# Patient Record
Sex: FEMALE | Race: WHITE | Employment: PART TIME | URBAN - METROPOLITAN AREA
[De-identification: names, ages, dates, MRNs, and addresses within clinical notes are randomized per-mention and may not be internally consistent; named-entity substitution may affect disease eponyms.]

---

## 2020-01-20 PROBLEM — G35 MS (MULTIPLE SCLEROSIS) (HCC): Status: ACTIVE | Noted: 2019-05-30

## 2020-01-20 PROBLEM — Z80.8 FAMILY HISTORY OF MELANOMA: Status: ACTIVE | Noted: 2020-01-20

## 2020-01-20 PROBLEM — Z85.848: Status: ACTIVE | Noted: 2020-01-20

## 2020-01-20 PROBLEM — G25.81 RESTLESS LEG SYNDROME, CONTROLLED: Status: ACTIVE | Noted: 2019-05-30

## 2021-04-05 ENCOUNTER — HOSPITAL ENCOUNTER (OUTPATIENT)
Dept: MAMMOGRAPHY | Age: 56
Discharge: HOME | End: 2021-04-05
Attending: INTERNAL MEDICINE
Payer: COMMERCIAL

## 2021-04-05 DIAGNOSIS — Z12.31 VISIT FOR SCREENING MAMMOGRAM: ICD-10-CM

## 2021-04-05 PROCEDURE — 77067 SCR MAMMO BI INCL CAD: CPT

## 2022-03-19 PROBLEM — G25.81 RESTLESS LEG SYNDROME, CONTROLLED: Status: ACTIVE | Noted: 2019-05-30

## 2022-03-19 PROBLEM — Z80.8 FAMILY HISTORY OF MELANOMA: Status: ACTIVE | Noted: 2020-01-20

## 2022-03-20 PROBLEM — Z85.848: Status: ACTIVE | Noted: 2020-01-20

## 2022-03-20 PROBLEM — G35 MS (MULTIPLE SCLEROSIS) (HCC): Status: ACTIVE | Noted: 2019-05-30

## 2022-05-23 ENCOUNTER — TELEPHONE (OUTPATIENT)
Dept: INTERNAL MEDICINE CLINIC | Facility: CLINIC | Age: 57
End: 2022-05-23

## 2022-05-23 NOTE — TELEPHONE ENCOUNTER
Lesion removed was a benign melanocytic nevus. No cause for concern. Hope you are feeling well. Thanks.   Trevor Rider

## 2022-06-14 ENCOUNTER — TELEPHONE (OUTPATIENT)
Dept: SURGERY | Age: 57
End: 2022-06-14

## 2022-06-14 NOTE — TELEPHONE ENCOUNTER
Patient called and cancelled her colonoscopy on 11/2/22 with Dr. Jeniffer May. She did not reschedule.  Jo Beasley

## 2022-07-18 ENCOUNTER — HOSPITAL ENCOUNTER (OUTPATIENT)
Dept: LAB | Age: 57
Discharge: HOME | End: 2022-07-21

## 2022-07-18 PROCEDURE — 88312 SPECIAL STAINS GROUP 1: CPT

## 2022-07-18 PROCEDURE — 88305 TISSUE EXAM BY PATHOLOGIST: CPT

## 2022-10-10 ENCOUNTER — PREP FOR PROCEDURE (OUTPATIENT)
Dept: SURGERY | Age: 57
End: 2022-10-10

## 2022-10-11 RX ORDER — SODIUM CHLORIDE 0.9 % (FLUSH) 0.9 %
5-40 SYRINGE (ML) INJECTION PRN
OUTPATIENT
Start: 2022-10-11

## 2022-10-11 RX ORDER — SODIUM CHLORIDE 9 MG/ML
INJECTION, SOLUTION INTRAVENOUS PRN
OUTPATIENT
Start: 2022-10-11

## 2022-10-11 RX ORDER — SODIUM CHLORIDE 0.9 % (FLUSH) 0.9 %
5-40 SYRINGE (ML) INJECTION EVERY 12 HOURS SCHEDULED
OUTPATIENT
Start: 2022-10-11

## 2022-12-12 ENCOUNTER — OFFICE VISIT (OUTPATIENT)
Dept: INTERNAL MEDICINE CLINIC | Facility: CLINIC | Age: 57
End: 2022-12-12
Payer: COMMERCIAL

## 2022-12-12 ENCOUNTER — TELEPHONE (OUTPATIENT)
Dept: INTERNAL MEDICINE CLINIC | Facility: CLINIC | Age: 57
End: 2022-12-12

## 2022-12-12 VITALS
HEART RATE: 73 BPM | TEMPERATURE: 97.9 F | OXYGEN SATURATION: 100 % | WEIGHT: 182 LBS | RESPIRATION RATE: 17 BRPM | HEIGHT: 70 IN | SYSTOLIC BLOOD PRESSURE: 129 MMHG | DIASTOLIC BLOOD PRESSURE: 79 MMHG | BODY MASS INDEX: 26.05 KG/M2

## 2022-12-12 DIAGNOSIS — M47.816 LUMBAR SPONDYLOSIS: Primary | ICD-10-CM

## 2022-12-12 DIAGNOSIS — M53.3 SACRAL BACK PAIN: ICD-10-CM

## 2022-12-12 DIAGNOSIS — S09.92XA BLUNT TRAUMA OF NOSE, INITIAL ENCOUNTER: Primary | ICD-10-CM

## 2022-12-12 PROCEDURE — 3074F SYST BP LT 130 MM HG: CPT | Performed by: NURSE PRACTITIONER

## 2022-12-12 PROCEDURE — 3078F DIAST BP <80 MM HG: CPT | Performed by: NURSE PRACTITIONER

## 2022-12-12 PROCEDURE — 99214 OFFICE O/P EST MOD 30 MIN: CPT | Performed by: NURSE PRACTITIONER

## 2022-12-12 SDOH — ECONOMIC STABILITY: FOOD INSECURITY: WITHIN THE PAST 12 MONTHS, YOU WORRIED THAT YOUR FOOD WOULD RUN OUT BEFORE YOU GOT MONEY TO BUY MORE.: NEVER TRUE

## 2022-12-12 SDOH — ECONOMIC STABILITY: FOOD INSECURITY: WITHIN THE PAST 12 MONTHS, THE FOOD YOU BOUGHT JUST DIDN'T LAST AND YOU DIDN'T HAVE MONEY TO GET MORE.: NEVER TRUE

## 2022-12-12 ASSESSMENT — ENCOUNTER SYMPTOMS
VOMITING: 0
COLOR CHANGE: 0
NAUSEA: 0
BACK PAIN: 1
FACIAL SWELLING: 0
RHINORRHEA: 0

## 2022-12-12 ASSESSMENT — SOCIAL DETERMINANTS OF HEALTH (SDOH): HOW HARD IS IT FOR YOU TO PAY FOR THE VERY BASICS LIKE FOOD, HOUSING, MEDICAL CARE, AND HEATING?: NOT HARD AT ALL

## 2022-12-12 NOTE — PROGRESS NOTES
12/12/2022 10:01 AM  Location:[facility] [address] [facility]  [place]  Patient #:  [id number]  YOB: 1965          YOUR LAST HEMOGLOBIN A1CS:   No results found for: HBA1C, [id number]    YOUR LAST LIPID PROFILE:   Lab Results   Component Value Date/Time    CHOL 209 03/23/2022 11:29 AM    HDL 75 03/23/2022 11:29 AM    VLDL 15 03/23/2022 11:29 AM         Lab Results   Component Value Date/Time    GFRAA 102 03/15/2021 08:20 AM    BUN 12 03/23/2022 11:29 AM     03/23/2022 11:29 AM    K 4.4 03/23/2022 11:29 AM     03/23/2022 11:29 AM    CO2 24 03/23/2022 11:29 AM           History of Present Illness     Chief Complaint   Patient presents with    Tailbone Pain     Pain when standing and sitting. Sacrum fracture in the past.        Ms. Mango Fajardo is a 59 y.o. female  who presents for nontraumatic sacral pain, nasal trauma. Ms. Mango Fajardo presents with 3 weeks of nontraumatic sacral pain. 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 She had the tumor removed in did not have to go through chemotherapy or radiation. She also has RSD, MS and hypertension. Recently she has had some elevated liver enzymes and is being followed by gastroenterology. Notes that for the past 3 weeks she has a generalized discomfort to her tailbone. She reports no new radicular pain, weakness, numbness, saddle anesthesia, fevers, weight loss. Reports that she has sacral bone pain as well as lumbar pain when she presses on the area or when she goes from sitting to standing. She has no history of osteoporosis and has never had any surgeries to that area except for the tumor removal.  She also notes that last night her dog accidentally hit her in the right side of the nose with his head. She heard a crack and ever since then her nose is mildly displaced to the left.   She initially had some bleeding from her right nare which has since stopped. She reports it feels full and experiences some pressure in that area. She denies any other facial pain, headaches, loss of consciousness. Allergies   Allergen Reactions    Penicillins Rash     Past Medical History:   Diagnosis Date    Cancer of spinal cord ([address] 2005    Hypertension     RSD (reflex sympathetic dystrophy)      Social History     Socioeconomic History    Marital status:      Spouse name: None    Number of children: None    Years of education: None    Highest education level: None   Tobacco Use    Smoking status: Never    Smokeless tobacco: Never   Substance and Sexual Activity    Alcohol use: Yes    Drug use: Never     Social Determinants of Health     Financial Resource Strain: Low Risk     Difficulty of Paying Living Expenses: Not hard at all   Food Insecurity: No Food Insecurity    Worried About [facility] in the Last Year: Never true    Ran Out of Food in the Last Year: Never true     Past Surgical History:   Procedure Laterality Date    BREAST REDUCTION SURGERY       Current Outpatient Medications   Medication Sig Dispense Refill    amitriptyline (ELAVIL) 50 MG tablet TAKE 1 TABLET BY MOUTH EVERY DAY AT NIGHT      cyclobenzaprine (FLEXERIL) 5 MG tablet Take 5 mg by mouth 3 times daily as needed      dimethyl fumarate (TECFIDERA) 240 MG delayed release capsule Take by mouth daily      famotidine (PEPCID) 40 MG tablet Take 40 mg by mouth      gabapentin (NEURONTIN) 300 MG capsule Take 300 mg by mouth.      lisinopril (PRINIVIL;ZESTRIL) 10 MG tablet TAKE ONE TABLET BY MOUTH ONE TIME DAILY      omeprazole (PRILOSEC) 40 MG delayed release capsule Take 40 mg by mouth daily      pramipexole (MIRAPEX) 0.5 MG tablet TAKE 1 TABLET (0.5 MG) BY MOUTH 3 (THREE) TIMES A DAY       No current facility-administered medications for this visit.      Health Maintenance   Topic Date Due    HIV screen  Never done    DTaP/Tdap/Td vaccine (1 - Tdap) Never done    Shingles vaccine (1 of 2) Never done    COVID-19 Vaccine (2 - Booster for Terrell series) 08/13/2021    Flu vaccine (1) Never done    Depression Screen  03/23/2023    Breast cancer screen  05/24/2024    Cervical cancer screen  03/22/2026    Lipids  03/23/2027    Colorectal Cancer Screen  07/18/2032    Hepatitis C screen  Completed    Hepatitis A vaccine  Aged Out    Hib vaccine  Aged Out    Meningococcal (ACWY) vaccine  Aged Out    Pneumococcal 0-64 years Vaccine  Aged Out     Family History   Problem Relation Age of Onset    Hypertension Father     Alzheimer's Disease Father     Hypertension Mother              Review of Systems  Review of Systems   Constitutional:  Negative for appetite change, chills and fever. HENT:  Positive for nosebleeds. Negative for facial swelling and rhinorrhea. Nasal deformity since dog hit her in the face last night   Cardiovascular:  Negative for leg swelling. Gastrointestinal:  Negative for nausea and vomiting. Musculoskeletal:  Positive for arthralgias (has numbness down legs, no new sx.) and back pain. Negative for gait problem and joint swelling. Skin:  Negative for color change. All other systems reviewed and are negative. /79 (Site: Left Upper Arm, Position: Sitting, Cuff Size: Large Adult)   Pulse 73   Temp 97.9 °F (36.6 °C) (Skin)   Resp 17   Ht 5' 10\" (1.778 m)   Wt 182 lb (82.6 kg)   SpO2 100%   BMI 26.11 kg/m²       Physical Exam    Physical Exam  Vitals and nursing note reviewed. Constitutional:       General: She is not in acute distress. Appearance: She is not ill-appearing, toxic-appearing or diaphoretic. HENT:      Head: Normocephalic. No raccoon eyes, Dalton's sign, contusion, right periorbital erythema or left periorbital erythema. Jaw: There is normal jaw occlusion. Nose: Nasal deformity (right nare tender, nose deviated left) present. Right Nostril: No foreign body, epistaxis, septal hematoma or occlusion.       Left Nostril: No foreign body, epistaxis, septal hematoma or occlusion. Right Turbinates: Swollen. Not enlarged. Right Sinus: No maxillary sinus tenderness or frontal sinus tenderness. Left Sinus: No maxillary sinus tenderness or frontal sinus tenderness. Mouth/Throat:      Mouth: Mucous membranes are moist.   Cardiovascular:      Rate and Rhythm: Regular rhythm. Pulmonary:      Effort: No respiratory distress. Breath sounds: No wheezing, rhonchi or rales. Musculoskeletal:      Cervical back: Normal.      Thoracic back: Normal.      Lumbar back: Tenderness present. Back:       Right lower leg: No edema. Left lower leg: No edema. Comments: TTP here, no crepitus or step offs noted. No deformity. Intact distal circulation. BLE strength 5/5   Neurological:      Mental Status: She is oriented to person, place, and time. Assessment & Plan      Current Outpatient Medications   Medication Sig Dispense Refill    amitriptyline (ELAVIL) 50 MG tablet TAKE 1 TABLET BY MOUTH EVERY DAY AT NIGHT      cyclobenzaprine (FLEXERIL) 5 MG tablet Take 5 mg by mouth 3 times daily as needed      dimethyl fumarate (TECFIDERA) 240 MG delayed release capsule Take by mouth daily      famotidine (PEPCID) 40 MG tablet Take 40 mg by mouth      gabapentin (NEURONTIN) 300 MG capsule Take 300 mg by mouth.      lisinopril (PRINIVIL;ZESTRIL) 10 MG tablet TAKE ONE TABLET BY MOUTH ONE TIME DAILY      omeprazole (PRILOSEC) 40 MG delayed release capsule Take 40 mg by mouth daily      pramipexole (MIRAPEX) 0.5 MG tablet TAKE 1 TABLET (0.5 MG) BY MOUTH 3 (THREE) TIMES A DAY       No current facility-administered medications for this visit. 1. Blunt trauma of nose, initial encounter  Urgent referral placed to ENT with turbinate swelling, mildly displacement of nose to left on exam.   She will go obtain x-rays today, we will follow-up to ensure that she has appropriate follow-up with ENT.   She denies any other facial pain, had no LOC. She was hit directly in the nose. Knows to call for any new or worsening symptoms and to go to the emergency department for any pain out of proportion, uncontrolled bleeding, new or concerning symptoms. - XR NASAL BONE (MIN 3 VIEWS ); Future  - [facility], [place]    2. Sacral back pain  Nontraumatic sacral buttock pain with history of spinal cord cancer and traumatic sacral fracture years ago. We will start with x-rays, anticipate that she may need advanced imaging. We will maintain close follow-up. Knows to call for any radicular symptoms or acutely new or worsening symptoms. She has no constitutional symptoms today. - XR LUMBAR SPINE (MIN 4 VIEWS); Future  - XR SACRUM COCCYX (MIN 2 VIEWS); Future      Time spent in counseling and coordination of care today, including review of all records, was 35 minutes, more than half of which was face to face. Discussed HPI with Dr. Armando Gaitan who agrees with plan of care.     Jose Dinh, AMBER, APRN - CNP

## 2022-12-12 NOTE — TELEPHONE ENCOUNTER
Talked with patient about xrays. Reviewed with Dr. Beckie Saucedo. Will proceed with MRI lumbar spine, dx, low back pain, lumbar spondylosis scoliosis and facet ostearthritis, L5-s1, ordered today. Please make sure she has heard from the ENT, urgent referral placed for nasal deformity after trauma. Xray negative for acute fracture. Thanks!

## 2022-12-13 DIAGNOSIS — S09.92XA BLUNT TRAUMA OF NOSE, INITIAL ENCOUNTER: ICD-10-CM

## 2022-12-13 DIAGNOSIS — M53.3 SACRAL BACK PAIN: ICD-10-CM

## 2022-12-13 NOTE — TELEPHONE ENCOUNTER
Patient Notified  Will call insurance to see where cheaper th have MRI done.   Will call back, if order needs to be faxed to another facility

## 2022-12-15 ENCOUNTER — OFFICE VISIT (OUTPATIENT)
Dept: ENT CLINIC | Age: 57
End: 2022-12-15
Payer: COMMERCIAL

## 2022-12-15 VITALS — HEART RATE: 82 BPM | HEIGHT: 70 IN | WEIGHT: 187 LBS | OXYGEN SATURATION: 99 % | BODY MASS INDEX: 26.77 KG/M2

## 2022-12-15 DIAGNOSIS — S02.2XXA CLOSED DISPLACED FRACTURE OF NASAL BONE, INITIAL ENCOUNTER: Primary | ICD-10-CM

## 2022-12-15 DIAGNOSIS — J34.2 DEVIATED NASAL SEPTUM: ICD-10-CM

## 2022-12-15 PROCEDURE — 21315 CLSD TX NSL FX MNPJ WO STBLJ: CPT | Performed by: STUDENT IN AN ORGANIZED HEALTH CARE EDUCATION/TRAINING PROGRAM

## 2022-12-15 PROCEDURE — 99204 OFFICE O/P NEW MOD 45 MIN: CPT | Performed by: STUDENT IN AN ORGANIZED HEALTH CARE EDUCATION/TRAINING PROGRAM

## 2022-12-15 ASSESSMENT — ENCOUNTER SYMPTOMS
EYE DISCHARGE: 0
COLOR CHANGE: 0
ABDOMINAL DISTENTION: 0
APNEA: 0

## 2022-12-15 NOTE — PROGRESS NOTES
HPI:  Lary Infante is a 59 y.o. female seen New    Chief Complaint   Patient presents with    Facial Injury     Patient presets today with facial injury caused by her dog on Sunday . 47-year-old female seen for new patient referral evaluation with stated complaint of recent nasal trauma. She describes this past Sunday approximately 4 days ago having an event where her dog jumped up excitedly and hit into her right lateral nose going from right to left direction. This caused significant pain and brief epistaxis. Her epistaxis resolved but she did note over the next day or so to have swelling to the right nasal bridge and a depression type feeling with slight right-sided nasal airway congestion/obstruction. This prompted PCP evaluation who recommended nasal x-rays which were unremarkable with no evidence of obvious nasal bone fracture. She now presents for follow-up ENT evaluation although the majority of her nasal congestion has resolved. She states that there is an obvious deformity described as the bridge and tip of her nose bending towards the left side. She states the majority of the swelling has resolved. She is no longer noticing nasal congestion. Past Medical History, Past Surgical History, Family history, Social History, and Medications were all reviewed with the patient today and updated as necessary.      Allergies   Allergen Reactions    Penicillins Rash     Other reaction(s): Hives, Rash-Allergy       Patient Active Problem List   Diagnosis    Family history of melanoma    Restless leg syndrome, controlled    Hypertension    MS (multiple sclerosis) (HCC)    RSD (reflex sympathetic dystrophy)    History of cancer of spinal cord       Current Outpatient Medications   Medication Sig    amitriptyline (ELAVIL) 50 MG tablet TAKE 1 TABLET BY MOUTH EVERY DAY AT NIGHT    cyclobenzaprine (FLEXERIL) 5 MG tablet Take 5 mg by mouth 3 times daily as needed    dimethyl fumarate (TECFIDERA) 240 MG delayed release capsule Take by mouth daily    famotidine (PEPCID) 40 MG tablet Take 40 mg by mouth    lisinopril (PRINIVIL;ZESTRIL) 10 MG tablet TAKE ONE TABLET BY MOUTH ONE TIME DAILY    omeprazole (PRILOSEC) 40 MG delayed release capsule Take 40 mg by mouth daily    pramipexole (MIRAPEX) 0.5 MG tablet TAKE 1 TABLET (0.5 MG) BY MOUTH 3 (THREE) TIMES A DAY    gabapentin (NEURONTIN) 300 MG capsule Take 300 mg by mouth. (Patient not taking: Reported on 12/15/2022)     No current facility-administered medications for this visit. Past Medical History:   Diagnosis Date    Cancer of spinal cord ([facility] [address] 2005    Hypertension     RSD (reflex sympathetic dystrophy)        Past Surgical History:   Procedure Laterality Date    BREAST REDUCTION SURGERY         Social History     Tobacco Use    Smoking status: Never    Smokeless tobacco: Never   Substance Use Topics    Alcohol use: Yes       Family History   Problem Relation Age of Onset    Hypertension Father     Alzheimer's Disease Father     Hypertension Mother         ROS:    Review of Systems   Constitutional:  Negative for activity change. HENT:  Negative for congestion. Eyes:  Negative for discharge. Respiratory:  Negative for apnea. Cardiovascular:  Negative for chest pain. Gastrointestinal:  Negative for abdominal distention. Endocrine: Negative for cold intolerance. Genitourinary:  Negative for difficulty urinating. Musculoskeletal:  Negative for arthralgias. Skin:  Negative for color change. Allergic/Immunologic: Negative for environmental allergies. Neurological:  Negative for dizziness. Hematological:  Negative for adenopathy. Psychiatric/Behavioral:  Negative for agitation. PHYSICAL EXAM:    Pulse 82   Ht 5' 10\" (1.778 m)   Wt 187 lb (84.8 kg)   SpO2 99%   BMI 26.83 kg/m²     Physical Exam  Vitals and nursing note reviewed. Constitutional:       Appearance: Normal appearance.    HENT:      Head: Normocephalic and atraumatic. Right Ear: Tympanic membrane, ear canal and external ear normal. There is no impacted cerumen. Left Ear: Tympanic membrane, ear canal and external ear normal. There is no impacted cerumen. Nose: Nasal deformity and septal deviation present. No congestion or rhinorrhea. Comments: Palpation bridge of nose mild bony step-off deformity to the right nasal bone and word towards the midline. Nasal bridge and septum globally deviated to the left with nasal tip to the left. Mouth/Throat:      Mouth: Mucous membranes are moist.      Pharynx: Oropharynx is clear. No oropharyngeal exudate or posterior oropharyngeal erythema. Eyes:      General: No scleral icterus. Pulmonary:      Effort: Pulmonary effort is normal.   Musculoskeletal:      Cervical back: Normal range of motion and neck supple. No rigidity. Lymphadenopathy:      Cervical: No cervical adenopathy. Skin:     General: Skin is warm and dry. Neurological:      Mental Status: She is alert and oriented to person, place, and time. Psychiatric:         Mood and Affect: Mood normal.         Behavior: Behavior normal.        Procedure: Closed reduction nasal bone fracture  Indication: Right-sided nasal bone fracture  Description: Patient with obvious palpable inwardly displaced right nasal bone fracture with step-off deformity. All risk benefits alternatives reviewed for reduction of nasal bone fracture. Topical Afrin and lidocaine were sprayed intranasally for anesthesia and after a few minutes a Claude elevator was placed intranasally deep to the right-sided nasal bone and with minimal pressure the nasal bone was outfractured laterally back to his normal anatomical position with a loud audible pop. There was brief epistaxis that was easily controlled with Afrin soaked cottonball and pressure. Patient tolerated well no complications.       ASSESSMENT and PLAN        ICD-10-CM    1. Closed displaced fracture of nasal bone, initial encounter  S02. 2XXA CT CLOSED TX NASAL BONE FX W/MNPJ W/O STABILIZATION      2. Deviated nasal septum  J34.2           Patient with obvious right-sided nasal bone fracture following injury from her dog to the nose 4 days ago. This was easily reduced as above with minimal epistaxis controlled with pressure and Afrin. Patient should have a notable improvement to nasal airway going forward and for any future residual nasal cosmetic deformity she can follow-up with an outside facial plastic surgeon if needed. Nasal saline misting sprays to the nose twice daily for the next few days and otherwise follow-up with ENT as needed.     Ekta Ferrari, DO  12/15/2022

## 2023-01-31 ENCOUNTER — HOSPITAL ENCOUNTER (OUTPATIENT)
Dept: ENDOSCOPY | Age: 58
Discharge: HOME | End: 2023-02-03
Payer: COMMERCIAL

## 2023-01-31 PROCEDURE — 3609015500 HC GASTRIC/DUODENAL MOTILITY &/OR MANOMETRY STUDY

## 2023-01-31 PROCEDURE — 6370000000 HC RX 637 (ALT 250 FOR IP): Performed by: INTERNAL MEDICINE

## 2023-01-31 RX ORDER — LIDOCAINE HYDROCHLORIDE 20 MG/ML
15 SOLUTION OROPHARYNGEAL
Status: DISCONTINUED | OUTPATIENT
Start: 2023-01-31 | End: 2023-02-04 | Stop reason: HOSPADM

## 2023-01-31 RX ADMIN — LIDOCAINE HYDROCHLORIDE 15 ML: 20 SOLUTION ORAL; TOPICAL at 09:36

## 2023-02-03 NOTE — PROCEDURES
[facility]  PROCEDURE NOTE    Name:  Olya Jo  MR#:  [id number]  :  1965  ACCOUNT #:  [de-identified]  DATE OF SERVICE:    PREOPERATIVE DIAGNOSIS:  Gastroesophageal reflux disease. POSTOPERATIVE DIAGNOSES:  1. Low or hypotensive lower esophageal sphincter pressure - 8 mmHg. 2.  Integrated relaxation pressure 9 mmHg within normal range. 3.  A 3 cm hiatal hernia. 4.  An 80% normal peristalsis and 20% ineffective. 5.  Distal contractile integral average . 6.  Impedance revealed 40% of liquid swallows with complete transit. PROCEDURE PERFORMED:  After obtaining informed consent, the patient underwent nasal intubation with the high-resolution impedance manometry probe. The lower esophageal sphincter was located between 42.3 and 43.9 cm from the naris. There was a 3 cm hiatal hernia present. The lower esophageal sphincter pressure averaged 8 mmHg which is below normal.  The IRP was 9 mmHg. Ten wet swallows were recorded. Two failed and one was borderline weak, but the remainder were normal with an average DCI of . Impedance data revealed 40% of liquid swallows with complete bolus transit. Normal is greater than 80%.     DISPOSITION:  Further followup per Dr. Surinder Garza MD      [id number]  D:  2023 14:27  T:  2023 18:56  JOB #:  [id number]  CC:  Martín Sood MD

## 2023-02-13 DIAGNOSIS — K21.00 GASTRO-ESOPHAGEAL REFLUX DISEASE WITH ESOPHAGITIS, WITHOUT BLEEDING: ICD-10-CM

## 2023-02-13 RX ORDER — OMEPRAZOLE 40 MG/1
CAPSULE, DELAYED RELEASE ORAL
Qty: 30 CAPSULE | Refills: 11 | Status: SHIPPED | OUTPATIENT
Start: 2023-02-13

## 2023-02-14 RX ORDER — PRAMIPEXOLE DIHYDROCHLORIDE 0.5 MG/1
TABLET ORAL
Qty: 90 TABLET | Refills: 11 | Status: SHIPPED | OUTPATIENT
Start: 2023-02-14

## 2023-02-14 RX ORDER — LISINOPRIL 10 MG/1
TABLET ORAL
Qty: 30 TABLET | Refills: 11 | Status: SHIPPED | OUTPATIENT
Start: 2023-02-14

## 2023-03-13 RX ORDER — AMITRIPTYLINE HYDROCHLORIDE 50 MG/1
TABLET, FILM COATED ORAL
Qty: 30 TABLET | Refills: 11 | Status: SHIPPED | OUTPATIENT
Start: 2023-03-13

## 2023-03-27 ENCOUNTER — OFFICE VISIT (OUTPATIENT)
Dept: INTERNAL MEDICINE CLINIC | Facility: CLINIC | Age: 58
End: 2023-03-27
Payer: COMMERCIAL

## 2023-03-27 VITALS
WEIGHT: 180 LBS | DIASTOLIC BLOOD PRESSURE: 80 MMHG | HEIGHT: 70 IN | HEART RATE: 84 BPM | SYSTOLIC BLOOD PRESSURE: 112 MMHG | OXYGEN SATURATION: 99 % | TEMPERATURE: 98.1 F | BODY MASS INDEX: 25.77 KG/M2

## 2023-03-27 DIAGNOSIS — K44.9 HIATAL HERNIA: ICD-10-CM

## 2023-03-27 DIAGNOSIS — Z85.848: ICD-10-CM

## 2023-03-27 DIAGNOSIS — Z00.00 ENCOUNTER FOR GENERAL ADULT MEDICAL EXAMINATION WITHOUT ABNORMAL FINDINGS: ICD-10-CM

## 2023-03-27 DIAGNOSIS — Z80.8 FAMILY HISTORY OF MELANOMA: ICD-10-CM

## 2023-03-27 DIAGNOSIS — G35 MS (MULTIPLE SCLEROSIS) (HCC): Primary | ICD-10-CM

## 2023-03-27 DIAGNOSIS — Z00.00 GENERAL MEDICAL EXAM: ICD-10-CM

## 2023-03-27 DIAGNOSIS — I10 ESSENTIAL (PRIMARY) HYPERTENSION: ICD-10-CM

## 2023-03-27 PROCEDURE — 99396 PREV VISIT EST AGE 40-64: CPT | Performed by: INTERNAL MEDICINE

## 2023-03-27 PROCEDURE — 3074F SYST BP LT 130 MM HG: CPT | Performed by: INTERNAL MEDICINE

## 2023-03-27 PROCEDURE — 99213 OFFICE O/P EST LOW 20 MIN: CPT | Performed by: INTERNAL MEDICINE

## 2023-03-27 PROCEDURE — 3079F DIAST BP 80-89 MM HG: CPT | Performed by: INTERNAL MEDICINE

## 2023-03-27 RX ORDER — LISINOPRIL 10 MG/1
10 TABLET ORAL DAILY
Qty: 30 TABLET | Refills: 11 | Status: CANCELLED | OUTPATIENT
Start: 2023-03-27

## 2023-03-27 RX ORDER — PRAMIPEXOLE DIHYDROCHLORIDE 0.5 MG/1
TABLET ORAL
Qty: 90 TABLET | Refills: 11 | Status: CANCELLED | OUTPATIENT
Start: 2023-03-27

## 2023-03-27 RX ORDER — AMITRIPTYLINE HYDROCHLORIDE 50 MG/1
50 TABLET, FILM COATED ORAL NIGHTLY
Qty: 30 TABLET | Refills: 11 | Status: CANCELLED | OUTPATIENT
Start: 2023-03-27

## 2023-03-27 SDOH — ECONOMIC STABILITY: INCOME INSECURITY: HOW HARD IS IT FOR YOU TO PAY FOR THE VERY BASICS LIKE FOOD, HOUSING, MEDICAL CARE, AND HEATING?: NOT VERY HARD

## 2023-03-27 SDOH — ECONOMIC STABILITY: HOUSING INSECURITY
IN THE LAST 12 MONTHS, WAS THERE A TIME WHEN YOU DID NOT HAVE A STEADY PLACE TO SLEEP OR SLEPT IN A SHELTER (INCLUDING NOW)?: NO

## 2023-03-27 SDOH — ECONOMIC STABILITY: FOOD INSECURITY: WITHIN THE PAST 12 MONTHS, YOU WORRIED THAT YOUR FOOD WOULD RUN OUT BEFORE YOU GOT MONEY TO BUY MORE.: NEVER TRUE

## 2023-03-27 SDOH — ECONOMIC STABILITY: FOOD INSECURITY: WITHIN THE PAST 12 MONTHS, THE FOOD YOU BOUGHT JUST DIDN'T LAST AND YOU DIDN'T HAVE MONEY TO GET MORE.: NEVER TRUE

## 2023-03-27 ASSESSMENT — ENCOUNTER SYMPTOMS
BLOOD IN STOOL: 0
WHEEZING: 0
VOMITING: 0
DIARRHEA: 0
SHORTNESS OF BREATH: 0
COUGH: 0
CONSTIPATION: 0
NAUSEA: 0

## 2023-03-27 ASSESSMENT — PATIENT HEALTH QUESTIONNAIRE - PHQ9
SUM OF ALL RESPONSES TO PHQ9 QUESTIONS 1 & 2: 0
SUM OF ALL RESPONSES TO PHQ QUESTIONS 1-9: 0
2. FEELING DOWN, DEPRESSED OR HOPELESS: 0
SUM OF ALL RESPONSES TO PHQ QUESTIONS 1-9: 0
1. LITTLE INTEREST OR PLEASURE IN DOING THINGS: 0

## 2023-03-27 NOTE — PROGRESS NOTES
3/27/2023 6:10 PM  Location:[facility] [address] [facility]  [place]  Patient #:  [id number]  YOB: 1965            History of Present Illness     Chief Complaint   Patient presents with    Annual Exam     Pt presents to the office today for their annual physical.       Ms. Jayy Duran is a 59 y.o. female  who presents for the above. Notes that she has no new weakness. Is not exercising regularly. Is not particularly cautious about diet. Is here for yearly preventive exam as well as follow up on chronic medical issues.              Allergies   Allergen Reactions    Penicillins Rash     Other reaction(s): Hives, Rash-Allergy     Past Medical History:   Diagnosis Date    Cancer of spinal cord ([address] 2005    Hypertension     RSD (reflex sympathetic dystrophy)      Social History     Socioeconomic History    Marital status:      Spouse name: None    Number of children: None    Years of education: None    Highest education level: None   Tobacco Use    Smoking status: Never    Smokeless tobacco: Never   Substance and Sexual Activity    Alcohol use: Yes    Drug use: Never     Social Determinants of Health     Financial Resource Strain: Low Risk     Difficulty of Paying Living Expenses: Not very hard   Food Insecurity: No Food Insecurity    Worried About Running Out of Food in the Last Year: Never true    Ran Out of Food in the Last Year: Never true   Transportation Needs: Unknown    Lack of Transportation (Non-Medical): No   Housing Stability: Unknown    Unstable Housing in the Last Year: No     Past Surgical History:   Procedure Laterality Date    BREAST REDUCTION SURGERY       Current Outpatient Medications   Medication Sig Dispense Refill    amitriptyline (ELAVIL) 50 MG tablet TAKE 1 TABLET BY MOUTH EVERY DAY AT NIGHT 30 tablet 11    lisinopril (PRINIVIL;ZESTRIL) 10 MG tablet TAKE 1 TABLET BY MOUTH EVERY DAY 30 tablet 11    pramipexole (MIRAPEX) 0.5 MG tablet TAKE 1 TABLET

## 2023-08-22 RX ORDER — AMITRIPTYLINE HYDROCHLORIDE 50 MG/1
50 TABLET, FILM COATED ORAL NIGHTLY
Qty: 30 TABLET | Refills: 11 | Status: SHIPPED | OUTPATIENT
Start: 2023-08-22

## 2023-08-22 RX ORDER — LISINOPRIL 10 MG/1
10 TABLET ORAL DAILY
Qty: 30 TABLET | Refills: 11 | Status: SHIPPED | OUTPATIENT
Start: 2023-08-22

## 2024-03-08 ENCOUNTER — OFFICE VISIT (OUTPATIENT)
Dept: INTERNAL MEDICINE CLINIC | Facility: CLINIC | Age: 59
End: 2024-03-08
Payer: COMMERCIAL

## 2024-03-08 VITALS
RESPIRATION RATE: 18 BRPM | DIASTOLIC BLOOD PRESSURE: 60 MMHG | HEART RATE: 60 BPM | SYSTOLIC BLOOD PRESSURE: 108 MMHG | HEIGHT: 70 IN | WEIGHT: 175.8 LBS | TEMPERATURE: 98.4 F | BODY MASS INDEX: 25.17 KG/M2 | OXYGEN SATURATION: 99 %

## 2024-03-08 DIAGNOSIS — R19.7 DIARRHEA, UNSPECIFIED TYPE: Primary | ICD-10-CM

## 2024-03-08 DIAGNOSIS — Z13.29 SCREENING FOR THYROID DISORDER: ICD-10-CM

## 2024-03-08 DIAGNOSIS — M79.674 GREAT TOE PAIN, RIGHT: ICD-10-CM

## 2024-03-08 DIAGNOSIS — Z13.220 SCREENING FOR LIPID DISORDERS: ICD-10-CM

## 2024-03-08 DIAGNOSIS — R19.7 DIARRHEA, UNSPECIFIED TYPE: ICD-10-CM

## 2024-03-08 LAB
ALBUMIN SERPL-MCNC: 3.8 G/DL (ref 3.2–4.6)
ALBUMIN/GLOB SERPL: 1.2 (ref 0.4–1.6)
ALP SERPL-CCNC: 118 U/L (ref 50–136)
ALT SERPL-CCNC: 53 U/L (ref 12–65)
ANION GAP SERPL CALC-SCNC: 4 MMOL/L (ref 2–11)
AST SERPL-CCNC: 27 U/L (ref 15–37)
BASOPHILS # BLD: 0 K/UL (ref 0–0.2)
BASOPHILS NFR BLD: 1 % (ref 0–2)
BILIRUB SERPL-MCNC: 0.3 MG/DL (ref 0.2–1.1)
BUN SERPL-MCNC: 12 MG/DL (ref 8–23)
CALCIUM SERPL-MCNC: 10.1 MG/DL (ref 8.3–10.4)
CHLORIDE SERPL-SCNC: 109 MMOL/L (ref 103–113)
CHOLEST SERPL-MCNC: 202 MG/DL
CO2 SERPL-SCNC: 29 MMOL/L (ref 21–32)
CREAT SERPL-MCNC: 0.6 MG/DL (ref 0.6–1)
DIFFERENTIAL METHOD BLD: ABNORMAL
EOSINOPHIL # BLD: 0.2 K/UL (ref 0–0.8)
EOSINOPHIL NFR BLD: 6 % (ref 0.5–7.8)
ERYTHROCYTE [DISTWIDTH] IN BLOOD BY AUTOMATED COUNT: 12.7 % (ref 11.9–14.6)
ERYTHROCYTE [SEDIMENTATION RATE] IN BLOOD: 2 MM/HR (ref 0–30)
GLOBULIN SER CALC-MCNC: 3.1 G/DL (ref 2.8–4.5)
GLUCOSE SERPL-MCNC: 90 MG/DL (ref 65–100)
HCT VFR BLD AUTO: 38.1 % (ref 35.8–46.3)
HDLC SERPL-MCNC: 71 MG/DL (ref 40–60)
HDLC SERPL: 2.8
HGB BLD-MCNC: 12.2 G/DL (ref 11.7–15.4)
IMM GRANULOCYTES # BLD AUTO: 0 K/UL (ref 0–0.5)
IMM GRANULOCYTES NFR BLD AUTO: 0 % (ref 0–5)
LDLC SERPL CALC-MCNC: 115.6 MG/DL
LYMPHOCYTES # BLD: 0.9 K/UL (ref 0.5–4.6)
LYMPHOCYTES NFR BLD: 25 % (ref 13–44)
MCH RBC QN AUTO: 31.2 PG (ref 26.1–32.9)
MCHC RBC AUTO-ENTMCNC: 32 G/DL (ref 31.4–35)
MCV RBC AUTO: 97.4 FL (ref 82–102)
MONOCYTES # BLD: 0.3 K/UL (ref 0.1–1.3)
MONOCYTES NFR BLD: 9 % (ref 4–12)
NEUTS SEG # BLD: 2.2 K/UL (ref 1.7–8.2)
NEUTS SEG NFR BLD: 59 % (ref 43–78)
NRBC # BLD: 0 K/UL (ref 0–0.2)
PLATELET # BLD AUTO: 302 K/UL (ref 150–450)
PMV BLD AUTO: 10.2 FL (ref 9.4–12.3)
POTASSIUM SERPL-SCNC: 4.5 MMOL/L (ref 3.5–5.1)
PROT SERPL-MCNC: 6.9 G/DL (ref 6.3–8.2)
RBC # BLD AUTO: 3.91 M/UL (ref 4.05–5.2)
SODIUM SERPL-SCNC: 142 MMOL/L (ref 136–146)
TRIGL SERPL-MCNC: 77 MG/DL (ref 35–150)
TSH W FREE THYROID IF ABNORMAL: 1.68 UIU/ML (ref 0.36–3.74)
URATE SERPL-MCNC: 3.1 MG/DL (ref 2.6–6)
VLDLC SERPL CALC-MCNC: 15.4 MG/DL (ref 6–23)
WBC # BLD AUTO: 3.7 K/UL (ref 4.3–11.1)

## 2024-03-08 PROCEDURE — 3074F SYST BP LT 130 MM HG: CPT | Performed by: NURSE PRACTITIONER

## 2024-03-08 PROCEDURE — 99214 OFFICE O/P EST MOD 30 MIN: CPT | Performed by: NURSE PRACTITIONER

## 2024-03-08 PROCEDURE — 3078F DIAST BP <80 MM HG: CPT | Performed by: NURSE PRACTITIONER

## 2024-03-08 ASSESSMENT — ENCOUNTER SYMPTOMS
DIARRHEA: 1
ABDOMINAL DISTENTION: 1
SHORTNESS OF BREATH: 0
ABDOMINAL PAIN: 0
CONSTIPATION: 0
VOMITING: 0
COUGH: 0
NAUSEA: 0

## 2024-03-08 ASSESSMENT — PATIENT HEALTH QUESTIONNAIRE - PHQ9
SUM OF ALL RESPONSES TO PHQ QUESTIONS 1-9: 0
2. FEELING DOWN, DEPRESSED OR HOPELESS: 0
SUM OF ALL RESPONSES TO PHQ9 QUESTIONS 1 & 2: 0
SUM OF ALL RESPONSES TO PHQ QUESTIONS 1-9: 0
SUM OF ALL RESPONSES TO PHQ QUESTIONS 1-9: 0
1. LITTLE INTEREST OR PLEASURE IN DOING THINGS: 0
SUM OF ALL RESPONSES TO PHQ QUESTIONS 1-9: 0

## 2024-03-08 NOTE — PROGRESS NOTES
Panel; Future    Screening for thyroid disorder  -     TSH with Reflex; Future       Check labs and routine labs as above, has appt with her pcp in 2 weeks. Requested this routine lab work. R/o gout regarding great toe pain.  Check stool studies, r/f c diff with recent hx of multiple rounds abx for uti's. Also check stool for h pylori, advised on avoiding peptobismol/bismuth products and stopping ppi for 1 week prior to turning in stool specimen. Consider referral back to GI based on these findings and her sx.   Will follow up by phone with results and further plans.      Orders Placed This Encounter   Procedures    Culture, Stool     Standing Status:   Future     Standing Expiration Date:   3/8/2025    Ova and Parasite Examination     Standing Status:   Future     Standing Expiration Date:   3/8/2025    C. difficile toxin Molecular     Standing Status:   Future     Standing Expiration Date:   3/8/2025    Gastrointestinal Panel, Molecular     Standing Status:   Future     Standing Expiration Date:   3/8/2025    H. Pylori Antigen, Stool     Standing Status:   Future     Standing Expiration Date:   3/8/2025    CBC with Auto Differential     Standing Status:   Future     Standing Expiration Date:   3/8/2025    Comprehensive Metabolic Panel     Standing Status:   Future     Standing Expiration Date:   3/8/2025    Sedimentation Rate     Standing Status:   Future     Standing Expiration Date:   3/8/2025    Uric Acid     Standing Status:   Future     Standing Expiration Date:   3/8/2025    Lipid Panel     Standing Status:   Future     Standing Expiration Date:   3/8/2025    TSH with Reflex     Standing Status:   Future     Standing Expiration Date:   3/8/2025           No follow-up provider specified.        Lois betancourt, APRN - NP

## 2024-03-15 ENCOUNTER — TELEPHONE (OUTPATIENT)
Dept: INTERNAL MEDICINE CLINIC | Facility: CLINIC | Age: 59
End: 2024-03-15

## 2024-03-15 DIAGNOSIS — R19.7 DIARRHEA, UNSPECIFIED TYPE: ICD-10-CM

## 2024-03-15 DIAGNOSIS — D70.8 OTHER NEUTROPENIA (HCC): Primary | ICD-10-CM

## 2024-03-15 NOTE — TELEPHONE ENCOUNTER
Labs look stable, wbc is mild low and rbc mild low, possibly just a lab variant and sometimes with a viral infection can be lower.   No evidence of gout.   I see no result of stool test yet- did she get that done? If so can we track that down?  How are the symptoms?  We can set up repeat cbc in 2 weeks lab only to recheck wbc. I will order.

## 2024-03-16 DIAGNOSIS — K21.00 GASTRO-ESOPHAGEAL REFLUX DISEASE WITH ESOPHAGITIS, WITHOUT BLEEDING: ICD-10-CM

## 2024-03-18 RX ORDER — OMEPRAZOLE 40 MG/1
CAPSULE, DELAYED RELEASE ORAL
Qty: 90 CAPSULE | Refills: 3 | Status: SHIPPED | OUTPATIENT
Start: 2024-03-18

## 2024-03-19 DIAGNOSIS — R19.7 DIARRHEA, UNSPECIFIED TYPE: ICD-10-CM

## 2024-03-19 RX ORDER — CHOLESTYRAMINE 4 G/9G
1 POWDER, FOR SUSPENSION ORAL DAILY
Qty: 10 PACKET | Refills: 0 | Status: SHIPPED | OUTPATIENT
Start: 2024-03-19 | End: 2024-03-29

## 2024-03-19 NOTE — TELEPHONE ENCOUNTER
I can send in a medication to help slow the diarrhea unfortunately until we know exact results of stool studies an antibiotic is not warranted/it may do more harm than good if it is not any type of bacterial problem. Did she drop off the stool?    Sent in questran powder to try daily for diarrhea , if stools slow down can stop taking.

## 2024-03-19 NOTE — TELEPHONE ENCOUNTER
Patient is dropping stool off today. She states that her symptoms are still bad and she would just like antibiotic. She sees DR Minor on 3/27/2024 she will  have repeat labs drawn at that time

## 2024-03-22 ENCOUNTER — TELEPHONE (OUTPATIENT)
Dept: INTERNAL MEDICINE CLINIC | Facility: CLINIC | Age: 59
End: 2024-03-22

## 2024-03-22 DIAGNOSIS — R19.7 DIARRHEA, UNSPECIFIED TYPE: Primary | ICD-10-CM

## 2024-03-22 LAB
BACTERIA SPEC CULT: NORMAL
H PYLORI AG STL QL IA: NEGATIVE
O+P SPEC MICRO: NORMAL
O+P STL CONC: NORMAL
SERVICE CMNT-IMP: NORMAL
SPECIMEN SOURCE: NORMAL
SPECIMEN SOURCE: NORMAL

## 2024-03-22 NOTE — TELEPHONE ENCOUNTER
I have talked with Ms. Broderick and have given her this message.  She does want a referral to the gastroenterologist

## 2024-03-22 NOTE — TELEPHONE ENCOUNTER
Stool negative for h pylori or other bacterial pathogen. Let  me know if she wants a gi referrL?

## 2024-03-22 NOTE — TELEPHONE ENCOUNTER
[facility], does she want to try anything for any abdominal cramping? I could send in levsin prn in the meantime to help with any diarrhea or cramping.

## 2024-03-26 RX ORDER — HYOSCYAMINE SULFATE 0.12 MG/1
1 TABLET SUBLINGUAL 4 TIMES DAILY PRN
Qty: 120 EACH | Refills: 0 | Status: SHIPPED | OUTPATIENT
Start: 2024-03-26

## 2024-03-26 NOTE — TELEPHONE ENCOUNTER
Pt tolerated infusion well. PICC lines flushed and heparinized. Pt discharged with walker ambulatory accompanied by    sent

## 2024-03-27 ENCOUNTER — OFFICE VISIT (OUTPATIENT)
Dept: INTERNAL MEDICINE CLINIC | Facility: CLINIC | Age: 59
End: 2024-03-27
Payer: COMMERCIAL

## 2024-03-27 VITALS
BODY MASS INDEX: 25.68 KG/M2 | SYSTOLIC BLOOD PRESSURE: 114 MMHG | RESPIRATION RATE: 18 BRPM | DIASTOLIC BLOOD PRESSURE: 67 MMHG | HEART RATE: 62 BPM | WEIGHT: 179 LBS

## 2024-03-27 DIAGNOSIS — R14.0 ABDOMINAL BLOATING: ICD-10-CM

## 2024-03-27 DIAGNOSIS — G90.50 RSD (REFLEX SYMPATHETIC DYSTROPHY): ICD-10-CM

## 2024-03-27 DIAGNOSIS — I10 ESSENTIAL (PRIMARY) HYPERTENSION: ICD-10-CM

## 2024-03-27 DIAGNOSIS — Z00.00 ENCOUNTER FOR WELL ADULT EXAM WITHOUT ABNORMAL FINDINGS: ICD-10-CM

## 2024-03-27 DIAGNOSIS — G35 MS (MULTIPLE SCLEROSIS) (HCC): ICD-10-CM

## 2024-03-27 DIAGNOSIS — R19.7 DIARRHEA, UNSPECIFIED TYPE: Primary | ICD-10-CM

## 2024-03-27 DIAGNOSIS — R19.03 RIGHT LOWER QUADRANT ABDOMINAL MASS: ICD-10-CM

## 2024-03-27 DIAGNOSIS — Z12.31 VISIT FOR SCREENING MAMMOGRAM: ICD-10-CM

## 2024-03-27 DIAGNOSIS — D70.8 OTHER NEUTROPENIA (HCC): ICD-10-CM

## 2024-03-27 DIAGNOSIS — Z80.8 FAMILY HISTORY OF MELANOMA: ICD-10-CM

## 2024-03-27 DIAGNOSIS — Z12.4 SCREENING FOR CERVICAL CANCER: ICD-10-CM

## 2024-03-27 DIAGNOSIS — R31.9 HEMATURIA, UNSPECIFIED TYPE: ICD-10-CM

## 2024-03-27 PROCEDURE — 99396 PREV VISIT EST AGE 40-64: CPT | Performed by: INTERNAL MEDICINE

## 2024-03-27 PROCEDURE — 99213 OFFICE O/P EST LOW 20 MIN: CPT | Performed by: INTERNAL MEDICINE

## 2024-03-27 PROCEDURE — 3074F SYST BP LT 130 MM HG: CPT | Performed by: INTERNAL MEDICINE

## 2024-03-27 PROCEDURE — 3078F DIAST BP <80 MM HG: CPT | Performed by: INTERNAL MEDICINE

## 2024-03-27 RX ORDER — AVOBENZONE, HOMOSALATE, OCTISALATE, OCTOCRYLENE, OXYBENZONE 30; 130; 50; 70; 40 MG/ML; MG/ML; MG/ML; MG/ML; MG/ML
LOTION TOPICAL
Refills: 0 | COMMUNITY
Start: 2024-03-27

## 2024-03-27 RX ORDER — BACILLUS COAGULANS/INULIN 500MM-1.5G
TABLET,CHEWABLE ORAL
COMMUNITY
Start: 2024-03-27

## 2024-03-27 ASSESSMENT — ENCOUNTER SYMPTOMS
ABDOMINAL DISTENTION: 1
BLOOD IN STOOL: 0
WHEEZING: 0
NAUSEA: 1
CONSTIPATION: 0
DIARRHEA: 1
SHORTNESS OF BREATH: 0
COUGH: 0
VOMITING: 0

## 2024-03-27 NOTE — PROGRESS NOTES
are no discontinued medications.     Diagnosis Orders   1. Diarrhea, unspecified type  CT CHEST ABDOMEN PELVIS W CONTRAST Additional Contrast? Radiologist Recommendation      2. Visit for screening mammogram  SE[facility] DIGITAL SCREEN BILATERAL      3. Screening for cervical cancer  PAP IG, Aptima HPV and rfx 16/18,45 (341117)    PAP IG, Aptima HPV and rfx 16/18,45 ([id number])      4. Essential (primary) hypertension        5. MS (multiple sclerosis) (HCC)        6. RSD (reflex sympathetic dystrophy)        7. Family history of melanoma        8. Other neutropenia (HCC)        9. Hematuria, unspecified type  Culture, Urine    Urinalysis      10. Abdominal bloating  CT CHEST ABDOMEN PELVIS W CONTRAST Additional Contrast? Radiologist Recommendation      11. Right lower quadrant abdominal mass  CT CHEST ABDOMEN PELVIS W CONTRAST Additional Contrast? Radiologist Recommendation      12. ACP (advance care planning)        13. Encounter for well adult exam without abnormal findings           Wellness information reviewed and appropriate screening addressed. Vitals look good.  Advised against h. Pylori treatment due to negative antigen screening.  Advised her to keep her appointment with GI scheduled next month.  Add fiber gummy as above.  Will discuss imaging over the phone.  Will discuss labs over the phone.    Labs reviewed.  Primarily reassurance provided today.  Will discuss pathology over the phone.  Knows to keep a low threshold for contacting the office with worsening symptoms. Follow up as documented or earlier as needed.     Return in about 1 year (around 3/27/2025) for YPE, fasting labs prior.          Chiquis Minor, [facility] Care Planning   Discussed the patient’s choices for care and treatment preferences in case of a health event that adversely affects decision-making abilities or is life-limiting. Recommended the patient document care preferences in state-specific advance directives. Also reviewed the process

## 2024-04-02 LAB
COLLECTION METHOD: NORMAL
CYTOLOGIST CVX/VAG CYTO: NORMAL
CYTOLOGY CVX/VAG DOC THIN PREP: NORMAL
DATE OF LMP: 0
HPV APTIMA: NEGATIVE
HPV GENOTYPE REFLEX: NORMAL
Lab: NORMAL
OTHER PT INFO: NORMAL
PAP SOURCE: NORMAL
PATH REPORT.FINAL DX SPEC: NORMAL
PREV CYTO INFO: NORMAL
PREV TREATMENT RESULTS: NORMAL
PREV TREATMENT: NORMAL
STAT OF ADQ CVX/VAG CYTO-IMP: NORMAL

## 2024-04-15 ENCOUNTER — TELEPHONE (OUTPATIENT)
Dept: INTERNAL MEDICINE CLINIC | Facility: CLINIC | Age: 59
End: 2024-04-15

## 2024-04-15 RX ORDER — CHOLESTYRAMINE 4 G/9G
POWDER, FOR SUSPENSION ORAL
Qty: 10 PACKET | Refills: 0 | OUTPATIENT
Start: 2024-04-15

## 2024-04-15 NOTE — TELEPHONE ENCOUNTER
Trinidad from [facility] stated they approval from insurance. They need chart notes for the orders for ct scan on chest, abdomen, and pelvis.   Phone [phone]   Fax [phone]

## 2024-04-17 ENCOUNTER — TELEPHONE (OUTPATIENT)
Dept: INTERNAL MEDICINE CLINIC | Facility: CLINIC | Age: 59
End: 2024-04-17

## 2024-04-17 DIAGNOSIS — R19.03 RIGHT LOWER QUADRANT ABDOMINAL MASS: ICD-10-CM

## 2024-04-17 DIAGNOSIS — R14.0 ABDOMINAL BLOATING: ICD-10-CM

## 2024-04-17 DIAGNOSIS — R19.7 DIARRHEA, UNSPECIFIED TYPE: ICD-10-CM

## 2024-04-17 NOTE — TELEPHONE ENCOUNTER
No acute findings on abdominal imaging.  You do have a lung nodule that we will discuss when you return next year.  Likely no cause for concern. Thanks.  [facility]

## 2024-04-18 ENCOUNTER — TELEPHONE (OUTPATIENT)
Dept: INTERNAL MEDICINE CLINIC | Facility: CLINIC | Age: 59
End: 2024-04-18

## 2024-04-18 NOTE — TELEPHONE ENCOUNTER
You are very low risk.  Follow up imaging is not even indicated however for peace of mind reasons we will plan on repeating in 1 year.  We'll discuss this further at your upcoming office visit. No cause for concern.

## 2024-04-18 NOTE — TELEPHONE ENCOUNTER
MsRadha Meggan has called and I have given her this message. She would like a call back. She is asking about the lung nodule that was found.

## 2024-07-01 RX ORDER — PRAMIPEXOLE DIHYDROCHLORIDE 0.5 MG/1
0.5 TABLET ORAL 3 TIMES DAILY
Qty: 90 TABLET | Refills: 11 | OUTPATIENT
Start: 2024-07-01

## 2024-07-01 RX ORDER — AMITRIPTYLINE HYDROCHLORIDE 50 MG/1
50 TABLET, FILM COATED ORAL NIGHTLY
Qty: 90 TABLET | Refills: 4 | Status: SHIPPED | OUTPATIENT
Start: 2024-07-01

## 2024-07-01 RX ORDER — PRAMIPEXOLE DIHYDROCHLORIDE 0.5 MG/1
0.5 TABLET ORAL 3 TIMES DAILY
Qty: 90 TABLET | Refills: 11 | Status: SHIPPED | OUTPATIENT
Start: 2024-07-01

## 2024-07-01 NOTE — TELEPHONE ENCOUNTER
Sending in half the dosage to start with since you've been off for a while.  Schedule fasting lipid panel and ALT in 2 months please.

## 2024-07-17 DIAGNOSIS — Z12.31 VISIT FOR SCREENING MAMMOGRAM: ICD-10-CM

## 2024-08-23 RX ORDER — LISINOPRIL 10 MG/1
10 TABLET ORAL DAILY
Qty: 90 TABLET | Refills: 3 | Status: SHIPPED | OUTPATIENT
Start: 2024-08-23

## 2025-04-21 RX ORDER — AMITRIPTYLINE HYDROCHLORIDE 50 MG/1
50 TABLET ORAL NIGHTLY
Qty: 90 TABLET | Refills: 3 | Status: SHIPPED | OUTPATIENT
Start: 2025-04-21

## 2025-06-16 ENCOUNTER — PATIENT MESSAGE (OUTPATIENT)
Dept: INTERNAL MEDICINE CLINIC | Facility: CLINIC | Age: 60
End: 2025-06-16

## 2025-06-16 DIAGNOSIS — G35 MS (MULTIPLE SCLEROSIS) (HCC): Primary | ICD-10-CM

## 2025-06-23 ENCOUNTER — CLINICAL SUPPORT (OUTPATIENT)
Age: 60
End: 2025-06-23

## 2025-06-23 VITALS
OXYGEN SATURATION: 97 % | SYSTOLIC BLOOD PRESSURE: 112 MMHG | DIASTOLIC BLOOD PRESSURE: 74 MMHG | TEMPERATURE: 98 F | HEART RATE: 76 BPM | RESPIRATION RATE: 20 BRPM

## 2025-06-23 DIAGNOSIS — G35 MS (MULTIPLE SCLEROSIS) (HCC): Primary | ICD-10-CM

## 2025-06-23 RX ORDER — ACETAMINOPHEN 325 MG/1
650 TABLET ORAL
Status: DISCONTINUED | OUTPATIENT
Start: 2025-06-23 | End: 2025-06-23 | Stop reason: HOSPADM

## 2025-06-23 RX ORDER — ALBUTEROL SULFATE 90 UG/1
4 INHALANT RESPIRATORY (INHALATION) PRN
Status: DISCONTINUED | OUTPATIENT
Start: 2025-06-23 | End: 2025-06-23 | Stop reason: HOSPADM

## 2025-06-23 RX ORDER — SODIUM CHLORIDE 9 MG/ML
5-250 INJECTION, SOLUTION INTRAVENOUS PRN
Status: DISCONTINUED | OUTPATIENT
Start: 2025-06-23 | End: 2025-06-23 | Stop reason: HOSPADM

## 2025-06-23 RX ORDER — EPINEPHRINE 1 MG/ML
0.3 INJECTION, SOLUTION, CONCENTRATE INTRAVENOUS PRN
Status: CANCELLED | OUTPATIENT
Start: 2025-06-24

## 2025-06-23 RX ORDER — ALBUTEROL SULFATE 90 UG/1
4 INHALANT RESPIRATORY (INHALATION) PRN
Status: CANCELLED | OUTPATIENT
Start: 2025-06-24

## 2025-06-23 RX ORDER — ACETAMINOPHEN 325 MG/1
650 TABLET ORAL
Status: CANCELLED | OUTPATIENT
Start: 2025-06-24

## 2025-06-23 RX ORDER — SODIUM CHLORIDE 9 MG/ML
5-250 INJECTION, SOLUTION INTRAVENOUS PRN
Status: CANCELLED | OUTPATIENT
Start: 2025-06-24

## 2025-06-23 RX ORDER — SODIUM CHLORIDE 9 MG/ML
INJECTION, SOLUTION INTRAVENOUS PRN
Status: DISCONTINUED | OUTPATIENT
Start: 2025-06-23 | End: 2025-06-23 | Stop reason: HOSPADM

## 2025-06-23 RX ORDER — HEPARIN 100 UNIT/ML
500 SYRINGE INTRAVENOUS PRN
Status: CANCELLED | OUTPATIENT
Start: 2025-06-24

## 2025-06-23 RX ORDER — ONDANSETRON 2 MG/ML
8 INJECTION INTRAMUSCULAR; INTRAVENOUS
Status: CANCELLED | OUTPATIENT
Start: 2025-06-24

## 2025-06-23 RX ORDER — SODIUM CHLORIDE 9 MG/ML
INJECTION, SOLUTION INTRAVENOUS PRN
Status: CANCELLED | OUTPATIENT
Start: 2025-06-24

## 2025-06-23 RX ORDER — HEPARIN 100 UNIT/ML
500 SYRINGE INTRAVENOUS PRN
Status: DISCONTINUED | OUTPATIENT
Start: 2025-06-23 | End: 2025-06-23 | Stop reason: HOSPADM

## 2025-06-23 RX ORDER — EPINEPHRINE 1 MG/ML
0.3 INJECTION, SOLUTION, CONCENTRATE INTRAVENOUS PRN
Status: DISCONTINUED | OUTPATIENT
Start: 2025-06-23 | End: 2025-06-23 | Stop reason: HOSPADM

## 2025-06-23 RX ORDER — SODIUM CHLORIDE 0.9 % (FLUSH) 0.9 %
5-40 SYRINGE (ML) INJECTION PRN
Status: CANCELLED | OUTPATIENT
Start: 2025-06-24

## 2025-06-23 RX ORDER — ONDANSETRON 2 MG/ML
8 INJECTION INTRAMUSCULAR; INTRAVENOUS
Status: DISCONTINUED | OUTPATIENT
Start: 2025-06-23 | End: 2025-06-23 | Stop reason: HOSPADM

## 2025-06-23 RX ORDER — DIPHENHYDRAMINE HYDROCHLORIDE 50 MG/ML
50 INJECTION, SOLUTION INTRAMUSCULAR; INTRAVENOUS
Status: CANCELLED | OUTPATIENT
Start: 2025-06-24

## 2025-06-23 RX ORDER — HYDROCORTISONE SODIUM SUCCINATE 100 MG/2ML
100 INJECTION INTRAMUSCULAR; INTRAVENOUS
Status: CANCELLED | OUTPATIENT
Start: 2025-06-24

## 2025-06-23 RX ORDER — SODIUM CHLORIDE 0.9 % (FLUSH) 0.9 %
5-40 SYRINGE (ML) INJECTION PRN
Status: DISCONTINUED | OUTPATIENT
Start: 2025-06-23 | End: 2025-06-23 | Stop reason: HOSPADM

## 2025-06-23 RX ORDER — DIPHENHYDRAMINE HYDROCHLORIDE 50 MG/ML
50 INJECTION, SOLUTION INTRAMUSCULAR; INTRAVENOUS
Status: DISCONTINUED | OUTPATIENT
Start: 2025-06-23 | End: 2025-06-23 | Stop reason: HOSPADM

## 2025-06-23 RX ORDER — HYDROCORTISONE SODIUM SUCCINATE 100 MG/2ML
100 INJECTION INTRAMUSCULAR; INTRAVENOUS
Status: DISCONTINUED | OUTPATIENT
Start: 2025-06-23 | End: 2025-06-23 | Stop reason: HOSPADM

## 2025-06-23 ASSESSMENT — PAIN DESCRIPTION - LOCATION: LOCATION: FOOT;HAND

## 2025-06-23 ASSESSMENT — PAIN SCALES - GENERAL: PAINLEVEL_OUTOF10: 3

## 2025-06-23 ASSESSMENT — PAIN DESCRIPTION - ORIENTATION: ORIENTATION: RIGHT;LEFT

## 2025-06-23 ASSESSMENT — PAIN DESCRIPTION - DESCRIPTORS: DESCRIPTORS: BURNING;PINS AND NEEDLES;NUMBNESS

## 2025-06-23 NOTE — PROGRESS NOTES
[facility]  [address], [address]  [place] [address]  Office : [phone], Fax: [phone]     Patient arrived ambulatory to the infusion suite today for a(n) steroid infusion.   Vital signs WNL: /75 (BP Site: Right Upper Arm, Patient Position: Sitting, BP Cuff Size: Medium Adult)   Pulse 76   Temp 98 °F (36.7 °C) (Temporal)   Resp 20   SpO2 98% on RA.   Pain level is 3/10 on the pain scale located in the hands and feet and describing it as numbness, tingling, burning on admission.   No contraindications noted. Patient offered warm blanket and pillow for comfort. Patient up ad zack to BR; offered drink and snacks during visit.    20 g 1\" IV placed in the right AC x 1 attempt; flushed with 10ml NS. Patient tolerated well.   Solu-Medrol 1000mg in 250ml NS administered at  250 ml/hr. Infusion Time: 1 hour 10 minutes.   Patient tolerated the infusion well, no complications noted.   No observation required/recommended.   Post infusion vital signs WNL: /74   Pulse 76   Temp 98 °F (36.7 °C) (Temporal)   Resp 20   SpO2 97% on RA.      PIV flushed with 10ml NS and removed without difficulty, catheter intact; dressing applied. Patient instructed to leave the dressing on for at least 30 minutes before removal.         Patient discharged ambulatory with steady gait out of infusion suite, feeling well. Patient instructed to call the ordering provider with any post-infusion issues.     Next appointment scheduled at a date/time convenient for them prior to patient's departure today.

## 2025-06-24 ENCOUNTER — CLINICAL SUPPORT (OUTPATIENT)
Age: 60
End: 2025-06-24

## 2025-06-24 VITALS
TEMPERATURE: 98.8 F | RESPIRATION RATE: 16 BRPM | OXYGEN SATURATION: 97 % | SYSTOLIC BLOOD PRESSURE: 118 MMHG | DIASTOLIC BLOOD PRESSURE: 70 MMHG | HEART RATE: 92 BPM

## 2025-06-24 DIAGNOSIS — G35 MS (MULTIPLE SCLEROSIS) (HCC): Primary | ICD-10-CM

## 2025-06-24 RX ORDER — ALBUTEROL SULFATE 90 UG/1
4 INHALANT RESPIRATORY (INHALATION) PRN
Status: DISCONTINUED | OUTPATIENT
Start: 2025-06-24 | End: 2025-06-24 | Stop reason: HOSPADM

## 2025-06-24 RX ORDER — SODIUM CHLORIDE 9 MG/ML
5-250 INJECTION, SOLUTION INTRAVENOUS PRN
Status: DISCONTINUED | OUTPATIENT
Start: 2025-06-24 | End: 2025-06-24 | Stop reason: HOSPADM

## 2025-06-24 RX ORDER — ALBUTEROL SULFATE 90 UG/1
4 INHALANT RESPIRATORY (INHALATION) PRN
Status: CANCELLED | OUTPATIENT
Start: 2025-06-25

## 2025-06-24 RX ORDER — DIPHENHYDRAMINE HYDROCHLORIDE 50 MG/ML
50 INJECTION, SOLUTION INTRAMUSCULAR; INTRAVENOUS
Status: CANCELLED | OUTPATIENT
Start: 2025-06-25

## 2025-06-24 RX ORDER — HEPARIN 100 UNIT/ML
500 SYRINGE INTRAVENOUS PRN
Status: CANCELLED | OUTPATIENT
Start: 2025-06-25

## 2025-06-24 RX ORDER — EPINEPHRINE 1 MG/ML
0.3 INJECTION, SOLUTION, CONCENTRATE INTRAVENOUS PRN
Status: DISCONTINUED | OUTPATIENT
Start: 2025-06-24 | End: 2025-06-24 | Stop reason: HOSPADM

## 2025-06-24 RX ORDER — SODIUM CHLORIDE 0.9 % (FLUSH) 0.9 %
5-40 SYRINGE (ML) INJECTION PRN
Status: CANCELLED | OUTPATIENT
Start: 2025-06-25

## 2025-06-24 RX ORDER — SODIUM CHLORIDE 9 MG/ML
INJECTION, SOLUTION INTRAVENOUS PRN
Status: DISCONTINUED | OUTPATIENT
Start: 2025-06-24 | End: 2025-06-24 | Stop reason: HOSPADM

## 2025-06-24 RX ORDER — EPINEPHRINE 1 MG/ML
0.3 INJECTION, SOLUTION, CONCENTRATE INTRAVENOUS PRN
Status: CANCELLED | OUTPATIENT
Start: 2025-06-25

## 2025-06-24 RX ORDER — ONDANSETRON 2 MG/ML
8 INJECTION INTRAMUSCULAR; INTRAVENOUS
Status: DISCONTINUED | OUTPATIENT
Start: 2025-06-24 | End: 2025-06-24 | Stop reason: HOSPADM

## 2025-06-24 RX ORDER — SODIUM CHLORIDE 9 MG/ML
INJECTION, SOLUTION INTRAVENOUS PRN
Status: CANCELLED | OUTPATIENT
Start: 2025-06-25

## 2025-06-24 RX ORDER — HEPARIN 100 UNIT/ML
500 SYRINGE INTRAVENOUS PRN
Status: DISCONTINUED | OUTPATIENT
Start: 2025-06-24 | End: 2025-06-24 | Stop reason: HOSPADM

## 2025-06-24 RX ORDER — HYDROCORTISONE SODIUM SUCCINATE 100 MG/2ML
100 INJECTION INTRAMUSCULAR; INTRAVENOUS
Status: CANCELLED | OUTPATIENT
Start: 2025-06-25

## 2025-06-24 RX ORDER — DIPHENHYDRAMINE HYDROCHLORIDE 50 MG/ML
50 INJECTION, SOLUTION INTRAMUSCULAR; INTRAVENOUS
Status: DISCONTINUED | OUTPATIENT
Start: 2025-06-24 | End: 2025-06-24 | Stop reason: HOSPADM

## 2025-06-24 RX ORDER — SODIUM CHLORIDE 9 MG/ML
5-250 INJECTION, SOLUTION INTRAVENOUS PRN
Status: CANCELLED | OUTPATIENT
Start: 2025-06-25

## 2025-06-24 RX ORDER — HYDROCORTISONE SODIUM SUCCINATE 100 MG/2ML
100 INJECTION INTRAMUSCULAR; INTRAVENOUS
Status: DISCONTINUED | OUTPATIENT
Start: 2025-06-24 | End: 2025-06-24 | Stop reason: HOSPADM

## 2025-06-24 RX ORDER — SODIUM CHLORIDE 0.9 % (FLUSH) 0.9 %
5-40 SYRINGE (ML) INJECTION PRN
Status: DISCONTINUED | OUTPATIENT
Start: 2025-06-24 | End: 2025-06-24 | Stop reason: HOSPADM

## 2025-06-24 RX ORDER — ONDANSETRON 2 MG/ML
8 INJECTION INTRAMUSCULAR; INTRAVENOUS
Status: CANCELLED | OUTPATIENT
Start: 2025-06-25

## 2025-06-24 RX ORDER — ACETAMINOPHEN 325 MG/1
650 TABLET ORAL
Status: DISCONTINUED | OUTPATIENT
Start: 2025-06-24 | End: 2025-06-24 | Stop reason: HOSPADM

## 2025-06-24 RX ORDER — ACETAMINOPHEN 325 MG/1
650 TABLET ORAL
Status: CANCELLED | OUTPATIENT
Start: 2025-06-25

## 2025-06-24 ASSESSMENT — PAIN DESCRIPTION - ORIENTATION: ORIENTATION: LEFT;RIGHT

## 2025-06-24 ASSESSMENT — PAIN DESCRIPTION - DESCRIPTORS: DESCRIPTORS: BURNING;TINGLING;NUMBNESS

## 2025-06-24 ASSESSMENT — PAIN DESCRIPTION - LOCATION: LOCATION: FOOT;HAND

## 2025-06-24 ASSESSMENT — PAIN SCALES - GENERAL: PAINLEVEL_OUTOF10: 3

## 2025-06-24 NOTE — PROGRESS NOTES
[facility]  [address] [facility], [address]  [place] [address]  Office : [phone], Fax: [phone]     Patient arrived ambulatory to the infusion suite today for a(n) steroid infusion.   Vital signs WNL /73 Right Upper Arm   Pulse 91   Temp 98.8 °F (37.1 °C) (Temporal)   Resp 16   SpO2 96% on RA.   Pain level is 2/10 on the pain scale located in the hands and feet and describing it as burning, tingling, numb on admission. No contraindications noted. Patient offered warm blanket and pillow for comfort. Patient up ad zack to BR; offered drink and snacks during visit.    20 g 1\" IV placed in the right AC x 1 attempt; flushed with 10ml NS. Patient tolerated well.   Solu-Medrol 1000mg in 250ml NS administered at  250 ml/hr. Infusion Time: 1 hour 7 minutes.   Patient tolerated the infusion well, no complications noted.   No observation required/recommended.   Post infusion vital signs WNL.      PIV flushed with 10ml NS and removed without difficulty, catheter intact; dressing applied. Patient instructed to leave the dressing on for at least 30 minutes before removal.         Patient discharged ambulatory with steady gait out of infusion suite, feeling well. Patient instructed to call the ordering provider with any post-infusion issues.     Next appointment scheduled at a date/time convenient for them prior to patient's departure today.

## 2025-06-25 ENCOUNTER — CLINICAL SUPPORT (OUTPATIENT)
Age: 60
End: 2025-06-25

## 2025-06-25 VITALS
DIASTOLIC BLOOD PRESSURE: 66 MMHG | HEART RATE: 77 BPM | SYSTOLIC BLOOD PRESSURE: 104 MMHG | RESPIRATION RATE: 16 BRPM | OXYGEN SATURATION: 95 % | TEMPERATURE: 98.4 F

## 2025-06-25 DIAGNOSIS — G35 MS (MULTIPLE SCLEROSIS) (HCC): Primary | ICD-10-CM

## 2025-06-25 RX ORDER — DIPHENHYDRAMINE HYDROCHLORIDE 50 MG/ML
50 INJECTION, SOLUTION INTRAMUSCULAR; INTRAVENOUS
Status: CANCELLED | OUTPATIENT
Start: 2025-06-26

## 2025-06-25 RX ORDER — SODIUM CHLORIDE 9 MG/ML
5-250 INJECTION, SOLUTION INTRAVENOUS PRN
Status: CANCELLED | OUTPATIENT
Start: 2025-06-26

## 2025-06-25 RX ORDER — ALBUTEROL SULFATE 90 UG/1
4 INHALANT RESPIRATORY (INHALATION) PRN
Status: CANCELLED | OUTPATIENT
Start: 2025-06-26

## 2025-06-25 RX ORDER — ACETAMINOPHEN 325 MG/1
650 TABLET ORAL
Status: CANCELLED | OUTPATIENT
Start: 2025-06-26

## 2025-06-25 RX ORDER — HEPARIN 100 UNIT/ML
500 SYRINGE INTRAVENOUS PRN
Status: CANCELLED | OUTPATIENT
Start: 2025-06-26

## 2025-06-25 RX ORDER — SODIUM CHLORIDE 0.9 % (FLUSH) 0.9 %
5-40 SYRINGE (ML) INJECTION PRN
Status: CANCELLED | OUTPATIENT
Start: 2025-06-26

## 2025-06-25 RX ORDER — ONDANSETRON 2 MG/ML
8 INJECTION INTRAMUSCULAR; INTRAVENOUS
Status: CANCELLED | OUTPATIENT
Start: 2025-06-26

## 2025-06-25 RX ORDER — EPINEPHRINE 1 MG/ML
0.3 INJECTION, SOLUTION, CONCENTRATE INTRAVENOUS PRN
Status: CANCELLED | OUTPATIENT
Start: 2025-06-26

## 2025-06-25 RX ORDER — HYDROCORTISONE SODIUM SUCCINATE 100 MG/2ML
100 INJECTION INTRAMUSCULAR; INTRAVENOUS
Status: CANCELLED | OUTPATIENT
Start: 2025-06-26

## 2025-06-25 RX ORDER — SODIUM CHLORIDE 9 MG/ML
5-250 INJECTION, SOLUTION INTRAVENOUS PRN
Status: DISCONTINUED | OUTPATIENT
Start: 2025-06-25 | End: 2025-06-25 | Stop reason: HOSPADM

## 2025-06-25 RX ORDER — SODIUM CHLORIDE 9 MG/ML
INJECTION, SOLUTION INTRAVENOUS PRN
Status: CANCELLED | OUTPATIENT
Start: 2025-06-26

## 2025-06-25 RX ORDER — AMITRIPTYLINE HYDROCHLORIDE 50 MG/1
50 TABLET ORAL NIGHTLY
Qty: 90 TABLET | Refills: 4 | OUTPATIENT
Start: 2025-06-25

## 2025-06-25 ASSESSMENT — PAIN DESCRIPTION - DESCRIPTORS: DESCRIPTORS: BURNING;NUMBNESS;TINGLING

## 2025-06-25 ASSESSMENT — PAIN SCALES - GENERAL: PAINLEVEL_OUTOF10: 2

## 2025-06-25 ASSESSMENT — PAIN DESCRIPTION - LOCATION: LOCATION: FOOT;HAND

## 2025-06-25 NOTE — PROGRESS NOTES
[facility]  [address], [address]  [place] [address]  Office : [phone], Fax: [phone]     Patient arrived ambulatory to the infusion suite today for a(n) steroid infusion.   Vital signs WNL: /75   Pulse 83   Temp 98.4 °F (36.9 °C) (Temporal)   Resp 16   SpO2 98% on RA.   Pain level is 2/10 on the pain scale located in the hands and feet and describing it as burning, tingling, numb on admission. No contraindications noted. Patient offered warm blanket and pillow for comfort. Patient up ad zack to BR; offered drink and snacks during visit.    20 g 1\" IV placed in the right AC x 1 attempt; flushed with 10ml NS. Patient tolerated well.   Solu-Medrol 1000mg in 250ml NS administered at  250 ml/hr. Infusion Time: 72 minutes.   Patient tolerated the infusion well, no complications noted.   No observation required/recommended.   Post infusion vital signs WNL: /66   Pulse 77   Temp 98.4 °F (36.9 °C) (Temporal)   Resp 16   SpO2 95% on RA.      PIV flushed with 10ml NS and removed without difficulty, catheter intact; dressing applied. Patient instructed to leave the dressing on for at least 30 minutes before removal.         Patient discharged ambulatory with steady gait out of infusion suite, feeling well. Patient instructed to call the ordering provider with any post-infusion issues.     Next appointment scheduled at a date/time convenient for them prior to patient's departure today.

## 2025-06-26 ENCOUNTER — CLINICAL SUPPORT (OUTPATIENT)
Age: 60
End: 2025-06-26

## 2025-06-26 VITALS
DIASTOLIC BLOOD PRESSURE: 77 MMHG | HEART RATE: 66 BPM | SYSTOLIC BLOOD PRESSURE: 125 MMHG | TEMPERATURE: 98.8 F | RESPIRATION RATE: 16 BRPM | OXYGEN SATURATION: 95 %

## 2025-06-26 DIAGNOSIS — G35 MS (MULTIPLE SCLEROSIS) (HCC): Primary | ICD-10-CM

## 2025-06-26 RX ORDER — HYDROCORTISONE SODIUM SUCCINATE 100 MG/2ML
100 INJECTION INTRAMUSCULAR; INTRAVENOUS
Status: CANCELLED | OUTPATIENT
Start: 2025-06-27

## 2025-06-26 RX ORDER — ACETAMINOPHEN 325 MG/1
650 TABLET ORAL
Status: CANCELLED | OUTPATIENT
Start: 2025-06-27

## 2025-06-26 RX ORDER — HEPARIN 100 UNIT/ML
500 SYRINGE INTRAVENOUS PRN
Status: CANCELLED | OUTPATIENT
Start: 2025-06-27

## 2025-06-26 RX ORDER — SODIUM CHLORIDE 9 MG/ML
INJECTION, SOLUTION INTRAVENOUS PRN
Status: CANCELLED | OUTPATIENT
Start: 2025-06-27

## 2025-06-26 RX ORDER — ONDANSETRON 2 MG/ML
8 INJECTION INTRAMUSCULAR; INTRAVENOUS
Status: CANCELLED | OUTPATIENT
Start: 2025-06-27

## 2025-06-26 RX ORDER — ALBUTEROL SULFATE 90 UG/1
4 INHALANT RESPIRATORY (INHALATION) PRN
Status: CANCELLED | OUTPATIENT
Start: 2025-06-27

## 2025-06-26 RX ORDER — SODIUM CHLORIDE 0.9 % (FLUSH) 0.9 %
5-40 SYRINGE (ML) INJECTION PRN
Status: CANCELLED | OUTPATIENT
Start: 2025-06-27

## 2025-06-26 RX ORDER — EPINEPHRINE 1 MG/ML
0.3 INJECTION, SOLUTION, CONCENTRATE INTRAVENOUS PRN
Status: CANCELLED | OUTPATIENT
Start: 2025-06-27

## 2025-06-26 RX ORDER — DIPHENHYDRAMINE HYDROCHLORIDE 50 MG/ML
50 INJECTION, SOLUTION INTRAMUSCULAR; INTRAVENOUS
Status: CANCELLED | OUTPATIENT
Start: 2025-06-27

## 2025-06-26 RX ORDER — SODIUM CHLORIDE 9 MG/ML
5-250 INJECTION, SOLUTION INTRAVENOUS PRN
Status: CANCELLED | OUTPATIENT
Start: 2025-06-27

## 2025-06-26 ASSESSMENT — PAIN DESCRIPTION - DESCRIPTORS: DESCRIPTORS: NUMBNESS;TINGLING

## 2025-06-26 ASSESSMENT — PAIN DESCRIPTION - LOCATION: LOCATION: FOOT;HAND

## 2025-06-26 ASSESSMENT — PAIN DESCRIPTION - ORIENTATION: ORIENTATION: LEFT;RIGHT

## 2025-06-26 ASSESSMENT — PAIN SCALES - GENERAL: PAINLEVEL_OUTOF10: 2

## 2025-06-26 NOTE — PROGRESS NOTES
[facility]  [address] [facility] [address] [facility], [address]  [place] [address]  Office : [phone], Fax: [phone]     Patient arrived ambulatory to the infusion suite today for a(n) steroid infusion.   Vital signs WNL: BP (!) 142/82   Pulse 78   Temp 98.8 °F (37.1 °C) (Temporal)   Resp 16   SpO2 98% on RA.  Pain level is 2/10 on the pain scale located in the hands and feet and describing it as numbness & tingling on admission. No contraindications noted. Patient offered warm blanket and pillow for comfort. Patient up ad zack to BR; offered drink and snacks during visit.    20 g 1\" IV placed in the right AC x 1 attempt; flushed with 10ml NS. Patient tolerated well.   Solu-Medrol 1000mg in 250ml NS administered at  250 ml/hr. Infusion Time: 67 minutes.   Patient tolerated the infusion well, no complications noted.   No observation required/recommended.   Post infusion vital signs WNL: /77   Pulse 66   Temp 98.8 °F (37.1 °C) (Temporal)   Resp 16   SpO2 95% on RA.      PIV flushed with 10ml NS and removed without difficulty, catheter intact; dressing applied. Patient instructed to leave the dressing on for at least 30 minutes before removal.         Patient discharged ambulatory with steady gait out of infusion suite, feeling well. Patient instructed to call the ordering provider with any post-infusion issues.     Next appointment scheduled at a date/time convenient for them prior to patient's departure today.

## 2025-06-27 ENCOUNTER — CLINICAL SUPPORT (OUTPATIENT)
Age: 60
End: 2025-06-27

## 2025-06-27 VITALS
SYSTOLIC BLOOD PRESSURE: 146 MMHG | DIASTOLIC BLOOD PRESSURE: 83 MMHG | RESPIRATION RATE: 16 BRPM | OXYGEN SATURATION: 97 % | TEMPERATURE: 98.8 F | HEART RATE: 75 BPM

## 2025-06-27 DIAGNOSIS — G35 MS (MULTIPLE SCLEROSIS) (HCC): Primary | ICD-10-CM

## 2025-06-27 RX ORDER — HYDROCORTISONE SODIUM SUCCINATE 100 MG/2ML
100 INJECTION INTRAMUSCULAR; INTRAVENOUS
Status: CANCELLED | OUTPATIENT
Start: 2025-06-27

## 2025-06-27 RX ORDER — DIPHENHYDRAMINE HYDROCHLORIDE 50 MG/ML
50 INJECTION, SOLUTION INTRAMUSCULAR; INTRAVENOUS
Status: CANCELLED | OUTPATIENT
Start: 2025-06-27

## 2025-06-27 RX ORDER — ALBUTEROL SULFATE 90 UG/1
4 INHALANT RESPIRATORY (INHALATION) PRN
Status: CANCELLED | OUTPATIENT
Start: 2025-06-27

## 2025-06-27 RX ORDER — HEPARIN 100 UNIT/ML
500 SYRINGE INTRAVENOUS PRN
Status: CANCELLED | OUTPATIENT
Start: 2025-06-27

## 2025-06-27 RX ORDER — EPINEPHRINE 1 MG/ML
0.3 INJECTION, SOLUTION, CONCENTRATE INTRAVENOUS PRN
Status: CANCELLED | OUTPATIENT
Start: 2025-06-27

## 2025-06-27 RX ORDER — SODIUM CHLORIDE 0.9 % (FLUSH) 0.9 %
5-40 SYRINGE (ML) INJECTION PRN
Status: CANCELLED | OUTPATIENT
Start: 2025-06-27

## 2025-06-27 RX ORDER — ACETAMINOPHEN 325 MG/1
650 TABLET ORAL
Status: CANCELLED | OUTPATIENT
Start: 2025-06-27

## 2025-06-27 RX ORDER — ONDANSETRON 2 MG/ML
8 INJECTION INTRAMUSCULAR; INTRAVENOUS
Status: CANCELLED | OUTPATIENT
Start: 2025-06-27

## 2025-06-27 RX ORDER — SODIUM CHLORIDE 9 MG/ML
INJECTION, SOLUTION INTRAVENOUS PRN
Status: CANCELLED | OUTPATIENT
Start: 2025-06-27

## 2025-06-27 RX ORDER — SODIUM CHLORIDE 9 MG/ML
5-250 INJECTION, SOLUTION INTRAVENOUS PRN
Status: CANCELLED | OUTPATIENT
Start: 2025-06-27

## 2025-06-27 ASSESSMENT — PAIN DESCRIPTION - DESCRIPTORS: DESCRIPTORS: NUMBNESS;BURNING;TINGLING

## 2025-06-27 ASSESSMENT — PAIN SCALES - GENERAL: PAINLEVEL_OUTOF10: 2

## 2025-06-27 ASSESSMENT — PAIN DESCRIPTION - LOCATION: LOCATION: HAND;FOOT

## 2025-06-27 ASSESSMENT — PAIN DESCRIPTION - ORIENTATION: ORIENTATION: LEFT;RIGHT

## 2025-06-27 NOTE — PROGRESS NOTES
[facility]  [address], [address]  [place] [address]  Office : [phone], Fax: [phone]      Patient arrived ambulatory to the infusion suite today for a(n) steroid infusion - treatment 5/5.   Vital signs WNL: BP (!) 150/88 (BP Site: Right Upper Arm, Patient Position: Sitting, BP Cuff Size: Medium Adult)   Pulse 61   Temp 98.8 °F (37.1 °C) (Temporal)   Resp 20   SpO2 96% on RA.  Pain level is 2/10 on the pain scale located in the hands and feet and describing it as numbness & tingling on admission. No contraindications noted. Patient offered warm blanket and pillow for comfort. Patient up ad zack to BR; offered drink and snacks during visit.     20 g 1\" IV placed in the right AC x 1 attempt; flushed with 10ml NS. Patient tolerated well.   Solu-Medrol 1000mg in 250ml NS administered at  250 ml/hr. Infusion Time: 70 minutes.   Patient tolerated the infusion well, no complications noted.   No observation required/recommended.   Post infusion vital signs WNL: BP (!) 146/83   Pulse 75   Temp 98.8 °F (37.1 °C) (Temporal)   Resp 16   SpO2 97%  on RA.      PIV flushed with 10ml NS and removed without difficulty, catheter intact; dressing applied. Patient instructed to leave the dressing on for at least 30 minutes before removal.         Patient discharged ambulatory with steady gait out of infusion suite, feeling well. Patient instructed to call the ordering provider with any post-infusion issues.

## 2025-06-30 RX ORDER — PRAMIPEXOLE DIHYDROCHLORIDE 0.5 MG/1
0.5 TABLET ORAL 3 TIMES DAILY
Qty: 270 TABLET | Refills: 0 | Status: SHIPPED | OUTPATIENT
Start: 2025-06-30

## 2025-07-01 ENCOUNTER — PATIENT MESSAGE (OUTPATIENT)
Dept: INTERNAL MEDICINE CLINIC | Facility: CLINIC | Age: 60
End: 2025-07-01

## 2025-07-01 ENCOUNTER — OFFICE VISIT (OUTPATIENT)
Dept: NEUROLOGY | Age: 60
End: 2025-07-01
Payer: COMMERCIAL

## 2025-07-01 VITALS
HEART RATE: 72 BPM | BODY MASS INDEX: 27.12 KG/M2 | OXYGEN SATURATION: 98 % | DIASTOLIC BLOOD PRESSURE: 77 MMHG | SYSTOLIC BLOOD PRESSURE: 130 MMHG | WEIGHT: 189 LBS

## 2025-07-01 DIAGNOSIS — R20.0 NUMBNESS AND TINGLING: ICD-10-CM

## 2025-07-01 DIAGNOSIS — G35 MS (MULTIPLE SCLEROSIS) (HCC): ICD-10-CM

## 2025-07-01 DIAGNOSIS — R53.82 CHRONIC FATIGUE: ICD-10-CM

## 2025-07-01 DIAGNOSIS — R20.2 NUMBNESS AND TINGLING: ICD-10-CM

## 2025-07-01 DIAGNOSIS — N39.0 CHRONIC UTI: ICD-10-CM

## 2025-07-01 DIAGNOSIS — G35 RELAPSING REMITTING MULTIPLE SCLEROSIS (HCC): Primary | ICD-10-CM

## 2025-07-01 DIAGNOSIS — I10 ESSENTIAL (PRIMARY) HYPERTENSION: Primary | ICD-10-CM

## 2025-07-01 PROCEDURE — 3078F DIAST BP <80 MM HG: CPT | Performed by: NURSE PRACTITIONER

## 2025-07-01 PROCEDURE — 99205 OFFICE O/P NEW HI 60 MIN: CPT | Performed by: NURSE PRACTITIONER

## 2025-07-01 PROCEDURE — 3075F SYST BP GE 130 - 139MM HG: CPT | Performed by: NURSE PRACTITIONER

## 2025-07-01 RX ORDER — GABAPENTIN 300 MG/1
300 CAPSULE ORAL 2 TIMES DAILY
Qty: 60 CAPSULE | Refills: 5 | Status: SHIPPED | OUTPATIENT
Start: 2025-07-01 | End: 2025-12-28

## 2025-07-01 ASSESSMENT — ENCOUNTER SYMPTOMS
CONSTIPATION: 0
PHOTOPHOBIA: 0
COUGH: 0
SHORTNESS OF BREATH: 0
BACK PAIN: 0
EYE PAIN: 1

## 2025-07-01 ASSESSMENT — PATIENT HEALTH QUESTIONNAIRE - PHQ9
SUM OF ALL RESPONSES TO PHQ QUESTIONS 1-9: 0
SUM OF ALL RESPONSES TO PHQ QUESTIONS 1-9: 0
DEPRESSION UNABLE TO ASSESS: FUNCTIONAL CAPACITY MOTIVATION LIMITS ACCURACY
2. FEELING DOWN, DEPRESSED OR HOPELESS: NOT AT ALL
1. LITTLE INTEREST OR PLEASURE IN DOING THINGS: NOT AT ALL
SUM OF ALL RESPONSES TO PHQ QUESTIONS 1-9: 0
SUM OF ALL RESPONSES TO PHQ QUESTIONS 1-9: 0

## 2025-07-01 NOTE — PROGRESS NOTES
[facility]  [address], [address]  [place] [address]  Phone: [phone] Fax [phone]  Michelle Terwilliger, APRN - CNP       Patient: Noy Broderick  CC:   Chief Complaint   Patient presents with    New Patient    Multiple Sclerosis     MS Flare-up  Next steps       Referring Provider: Chiquis Minor MD     History of Present Illness:     Noy Broderick is a 59 y.o. RH female who presents for evaluation of Multiple Sclerosis.     Accompanied by: Self and Spouse Chano    Patient states that she is here to establish care. She states that in 2019 she went to bed and she felt electricity flowing thru her body. She had + Lhermitte's sign and initially dx by Dr Ann. She had an MRI and testing done. She had lumbar puncture done in 2019 she states that it was + for oligoclonal bands. She had a hard time writing. She would have a hard time walking and would trip over curbs- she would have her right foot drag. She relates that when she gets hot she will have a hard time with her right foot. She than decided to see Dr Braxton and was started on Tecfidera 120 mg BID for approximately 5 years and had the dose increased to 240 mg BID for about a year now. 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 She was dx with this 16 years ago and she had this removed. She states that she has a follow up to see her neuro surgeon, Dr Craft. She states that she developed RSD after this in her left foot. She states that she had a recent weakness to her hands with weakness and tingling with leg weakness. She had received an IV solumedrol infusion 1 gram for 5 days completed on 6/27/2025. She relates she still has some has hand weakness. She had seen an eye Dr and was told she had left eye iritis. She was given steroid eye drops. She has no loss of vision. She states that she is still having some weakness to hands with numbness and tingling to hands and feet. She states that she is

## 2025-07-10 DIAGNOSIS — I10 ESSENTIAL (PRIMARY) HYPERTENSION: ICD-10-CM

## 2025-07-10 DIAGNOSIS — R53.82 CHRONIC FATIGUE: ICD-10-CM

## 2025-07-10 DIAGNOSIS — G35 MS (MULTIPLE SCLEROSIS) (HCC): ICD-10-CM

## 2025-07-10 LAB
25(OH)D3 SERPL-MCNC: 27.7 NG/ML (ref 30–100)
BASOPHILS # BLD: 0.03 K/UL (ref 0–0.2)
BASOPHILS NFR BLD: 0.7 % (ref 0–2)
DIFFERENTIAL METHOD BLD: ABNORMAL
EOSINOPHIL # BLD: 0.14 K/UL (ref 0–0.8)
EOSINOPHIL NFR BLD: 3.3 % (ref 0.5–7.8)
ERYTHROCYTE [DISTWIDTH] IN BLOOD BY AUTOMATED COUNT: 12.7 % (ref 11.9–14.6)
HCT VFR BLD AUTO: 39.4 % (ref 35.8–46.3)
HGB BLD-MCNC: 12.7 G/DL (ref 11.7–15.4)
IMM GRANULOCYTES # BLD AUTO: 0.02 K/UL (ref 0–0.5)
IMM GRANULOCYTES NFR BLD AUTO: 0.5 % (ref 0–5)
LYMPHOCYTES # BLD: 1.07 K/UL (ref 0.5–4.6)
LYMPHOCYTES NFR BLD: 25.5 % (ref 13–44)
MCH RBC QN AUTO: 31.9 PG (ref 26.1–32.9)
MCHC RBC AUTO-ENTMCNC: 32.2 G/DL (ref 31.4–35)
MCV RBC AUTO: 99 FL (ref 82–102)
MONOCYTES # BLD: 0.45 K/UL (ref 0.1–1.3)
MONOCYTES NFR BLD: 10.7 % (ref 4–12)
NEUTS SEG # BLD: 2.48 K/UL (ref 1.7–8.2)
NEUTS SEG NFR BLD: 59.3 % (ref 43–78)
NRBC # BLD: 0 K/UL (ref 0–0.2)
PLATELET # BLD AUTO: 247 K/UL (ref 150–450)
PMV BLD AUTO: 10.1 FL (ref 9.4–12.3)
RBC # BLD AUTO: 3.98 M/UL (ref 4.05–5.2)
TSH W FREE THYROID IF ABNORMAL: 2.83 UIU/ML (ref 0.27–4.2)
VIT B12 SERPL-MCNC: 282 PG/ML (ref 193–986)
WBC # BLD AUTO: 4.2 K/UL (ref 4.3–11.1)

## 2025-08-31 DIAGNOSIS — I10 ESSENTIAL (PRIMARY) HYPERTENSION: Primary | ICD-10-CM

## 2025-09-02 RX ORDER — LISINOPRIL 10 MG/1
10 TABLET ORAL DAILY
Qty: 60 TABLET | Refills: 1 | Status: SHIPPED | OUTPATIENT
Start: 2025-09-02